# Patient Record
Sex: FEMALE | URBAN - METROPOLITAN AREA
[De-identification: names, ages, dates, MRNs, and addresses within clinical notes are randomized per-mention and may not be internally consistent; named-entity substitution may affect disease eponyms.]

---

## 2021-01-18 ENCOUNTER — OFFICE VISIT (OUTPATIENT)
Dept: URBAN - METROPOLITAN AREA CLINIC 71 | Facility: CLINIC | Age: 51
End: 2021-01-18
Payer: COMMERCIAL

## 2021-01-18 DIAGNOSIS — R51.9 HEADACHE, UNSPECIFIED: ICD-10-CM

## 2021-01-18 DIAGNOSIS — H52.13 MYOPIA, BILATERAL: Primary | ICD-10-CM

## 2021-01-18 PROCEDURE — 92310 CONTACT LENS FITTING OU: CPT | Performed by: OPTOMETRIST

## 2021-01-18 PROCEDURE — 92014 COMPRE OPH EXAM EST PT 1/>: CPT | Performed by: OPTOMETRIST

## 2021-01-18 ASSESSMENT — VISUAL ACUITY
OD: 20/20
OS: 20/20

## 2021-01-18 ASSESSMENT — KERATOMETRY
OS: 45.13
OD: 44.63

## 2021-01-18 NOTE — IMPRESSION/PLAN
Impression: Headache, unspecified: R51.9. Headaches noted for past 6 months. Steroids helped, but pt is no longer taking them. Ocular health unremarkable. Plan: Continue to observe. Continue care with PCP. Pt has MRI scheduled 01/2021.

## 2022-08-04 ENCOUNTER — OFFICE VISIT (OUTPATIENT)
Dept: URBAN - METROPOLITAN AREA CLINIC 71 | Facility: CLINIC | Age: 52
End: 2022-08-04

## 2022-08-04 DIAGNOSIS — H04.123 DRY EYE SYNDROME OF BILATERAL LACRIMAL GLANDS: ICD-10-CM

## 2022-08-04 DIAGNOSIS — H52.4 PRESBYOPIA: ICD-10-CM

## 2022-08-04 DIAGNOSIS — H25.13 AGE-RELATED NUCLEAR CATARACT, BILATERAL: ICD-10-CM

## 2022-08-04 DIAGNOSIS — H52.13 MYOPIA, BILATERAL: Primary | ICD-10-CM

## 2022-08-04 PROCEDURE — 92310 CONTACT LENS FITTING OU: CPT

## 2022-08-04 PROCEDURE — 92012 INTRM OPH EXAM EST PATIENT: CPT

## 2022-08-04 ASSESSMENT — INTRAOCULAR PRESSURE
OD: 16
OS: 13

## 2022-08-04 ASSESSMENT — VISUAL ACUITY
OD: 20/20
OS: 20/20

## 2022-08-04 NOTE — IMPRESSION/PLAN
Impression: Myopia, bilateral: H52.13. Dispensed trial lenses due to small change in contact lens Rx. Pt to call if any issues with rx or if she likes fit, then will finalize Rx. Plan: Dispensed new glasses Rx today and discussed changes and possible adaptation period. Patient to call if any issues with new glasses occur.

## 2025-04-07 ENCOUNTER — OFFICE VISIT (OUTPATIENT)
Dept: URBAN - METROPOLITAN AREA CLINIC 75 | Facility: CLINIC | Age: 55
End: 2025-04-07

## 2025-04-07 DIAGNOSIS — H25.13 AGE-RELATED NUCLEAR CATARACT, BILATERAL: ICD-10-CM

## 2025-04-07 DIAGNOSIS — H52.13 MYOPIA, BILATERAL: Primary | ICD-10-CM

## 2025-04-07 PROCEDURE — 92014 COMPRE OPH EXAM EST PT 1/>: CPT | Performed by: OPTOMETRIST

## 2025-04-07 PROCEDURE — 92310 CONTACT LENS FITTING OU: CPT | Performed by: OPTOMETRIST

## 2025-04-07 ASSESSMENT — VISUAL ACUITY
OD: 20/15
OS: 20/20

## 2025-04-07 ASSESSMENT — INTRAOCULAR PRESSURE
OD: 15
OS: 17